# Patient Record
Sex: MALE | Race: OTHER | HISPANIC OR LATINO | URBAN - METROPOLITAN AREA
[De-identification: names, ages, dates, MRNs, and addresses within clinical notes are randomized per-mention and may not be internally consistent; named-entity substitution may affect disease eponyms.]

---

## 2019-08-26 ENCOUNTER — EMERGENCY (EMERGENCY)
Facility: HOSPITAL | Age: 54
LOS: 1 days | Discharge: HOME | End: 2019-08-26
Attending: EMERGENCY MEDICINE | Admitting: EMERGENCY MEDICINE
Payer: COMMERCIAL

## 2019-08-26 VITALS
RESPIRATION RATE: 18 BRPM | WEIGHT: 220.02 LBS | SYSTOLIC BLOOD PRESSURE: 143 MMHG | TEMPERATURE: 99 F | HEART RATE: 92 BPM | OXYGEN SATURATION: 96 % | DIASTOLIC BLOOD PRESSURE: 93 MMHG

## 2019-08-26 PROCEDURE — 99283 EMERGENCY DEPT VISIT LOW MDM: CPT

## 2019-08-26 PROCEDURE — 73610 X-RAY EXAM OF ANKLE: CPT

## 2019-08-26 PROCEDURE — 99284 EMERGENCY DEPT VISIT MOD MDM: CPT | Mod: 25

## 2019-08-26 PROCEDURE — 73610 X-RAY EXAM OF ANKLE: CPT | Mod: 26,RT

## 2019-08-26 PROCEDURE — 73590 X-RAY EXAM OF LOWER LEG: CPT

## 2019-08-26 PROCEDURE — 73590 X-RAY EXAM OF LOWER LEG: CPT | Mod: 26,RT

## 2019-08-26 RX ORDER — IBUPROFEN 200 MG
600 TABLET ORAL ONCE
Refills: 0 | Status: COMPLETED | OUTPATIENT
Start: 2019-08-26 | End: 2019-08-26

## 2019-08-26 RX ORDER — AMLODIPINE BESYLATE 2.5 MG/1
0 TABLET ORAL
Qty: 0 | Refills: 0 | DISCHARGE

## 2019-08-26 RX ORDER — HYDROCHLOROTHIAZIDE 25 MG
0 TABLET ORAL
Qty: 0 | Refills: 0 | DISCHARGE

## 2019-08-26 RX ORDER — LISINOPRIL 2.5 MG/1
0 TABLET ORAL
Qty: 0 | Refills: 0 | DISCHARGE

## 2019-08-26 RX ORDER — ATORVASTATIN CALCIUM 80 MG/1
0 TABLET, FILM COATED ORAL
Qty: 0 | Refills: 0 | DISCHARGE

## 2019-08-26 RX ADMIN — Medication 600 MILLIGRAM(S): at 15:40

## 2019-08-26 NOTE — ED PROVIDER NOTE - NSFOLLOWUPINSTRUCTIONS_ED_ALL_ED_FT
1) Follow-up with Dr. Rodriguez. Call today / next business day for prompt follow-up.  2) Return to Emergency room for any worsening or persistent pain, weakness, fever, or any other concerning symptoms.  3) See attached instruction sheets for additional information, including information regarding signs and symptoms to look out for, reasons to seek immediate care and other important instructions.  4) Rest, ice, compression, elevation  5) Motrin 600mg every 6 hours as needed for pain.  Take with food

## 2019-08-26 NOTE — ED ADULT NURSE NOTE - NSIMPLEMENTINTERV_GEN_ALL_ED
Implemented All Fall Risk Interventions:  [place] to call system. Call bell, personal items and telephone within reach. Instruct patient to call for assistance. Room bathroom lighting operational. Non-slip footwear when patient is off stretcher. Physically safe environment: no spills, clutter or unnecessary equipment. Stretcher in lowest position, wheels locked, appropriate side rails in place. Provide visual cue, wrist band, yellow gown, etc. Monitor gait and stability. Monitor for mental status changes and reorient to person, place, and time. Review medications for side effects contributing to fall risk. Reinforce activity limits and safety measures with patient and family.

## 2019-08-26 NOTE — ED PROVIDER NOTE - PHYSICAL EXAMINATION
Gen: Alert, NAD  Head/eyes: NC/AT, PERRL, EOMI, normal lids/conjunctiva, no scleral icterus  ENT: airway patent  Neck: supple, no tenderness/meningismus/JVD, Trachea midline  Pulm/lung: Bilateral clear BS, normal resp effort, no wheeze/stridor/retractions  CV/heart: RRR, no M/R/G, +2 dist pulses (radial, pedal DP/PT, popliteal)  GI/Abd: soft, NT/ND, +BS, no guarding/rebound tenderness  Musculoskeletal: no edema/erythema/cyanosis, FROM in all extremities, no C/T/L spine ttp, +ttp right lateral malleolus, mild ttp right fibular head, no base of 5th MTP  Skin: no rash, no vesicles, no petechaie, no ecchymosis, no swelling  Neuro: AAOx3, CN 2-12 intact, normal sensation, 5/5 motor strength in all extremities, normal gait, no dysmetria

## 2019-08-26 NOTE — ED ADULT NURSE NOTE - OBJECTIVE STATEMENT
patient came in ED from work with c/o right ankle pain. patient states he came off his truck and as he landed on a wooden floor his right ankle twisted and he landed on his buttocks. patient denies head injury.

## 2019-08-26 NOTE — ED PROVIDER NOTE - OBJECTIVE STATEMENT
54 yo male s/p mechanical fall out of truck stepped onto wooden pallet twisted right ankle c/o right lateral malleolar pain, nonradiating, mild to moderate, worse with movement. No medications taken for this. No head injury, no other complaints.

## 2019-08-26 NOTE — ED PROVIDER NOTE - NS ED ROS FT

## 2023-09-11 PROBLEM — I10 ESSENTIAL (PRIMARY) HYPERTENSION: Chronic | Status: ACTIVE | Noted: 2019-08-26

## 2023-09-19 ENCOUNTER — RESULT CHARGE (OUTPATIENT)
Age: 58
End: 2023-09-19

## 2023-09-20 ENCOUNTER — APPOINTMENT (OUTPATIENT)
Dept: FAMILY MEDICINE | Facility: HOSPITAL | Age: 58
End: 2023-09-20
Payer: SELF-PAY

## 2023-09-20 ENCOUNTER — OUTPATIENT (OUTPATIENT)
Dept: OUTPATIENT SERVICES | Facility: HOSPITAL | Age: 58
LOS: 1 days | End: 2023-09-20
Payer: SELF-PAY

## 2023-09-20 VITALS
OXYGEN SATURATION: 97 % | HEART RATE: 74 BPM | TEMPERATURE: 98 F | DIASTOLIC BLOOD PRESSURE: 86 MMHG | BODY MASS INDEX: 34.11 KG/M2 | SYSTOLIC BLOOD PRESSURE: 154 MMHG | WEIGHT: 205 LBS | RESPIRATION RATE: 14 BRPM

## 2023-09-20 DIAGNOSIS — Z00.00 ENCOUNTER FOR GENERAL ADULT MEDICAL EXAMINATION W/OUT ABNORMAL FINDINGS: ICD-10-CM

## 2023-09-20 DIAGNOSIS — E66.9 OBESITY, UNSPECIFIED: ICD-10-CM

## 2023-09-20 DIAGNOSIS — Z91.19 PATIENT'S NONCOMPLIANCE WITH OTHER MEDICAL TREATMENT AND REGIMEN: ICD-10-CM

## 2023-09-20 DIAGNOSIS — I10 ESSENTIAL (PRIMARY) HYPERTENSION: ICD-10-CM

## 2023-09-20 DIAGNOSIS — M81.0 AGE-RELATED OSTEOPOROSIS WITHOUT CURRENT PATHOLOGICAL FRACTURE: ICD-10-CM

## 2023-09-20 DIAGNOSIS — E78.5 HYPERLIPIDEMIA, UNSPECIFIED: ICD-10-CM

## 2023-09-20 DIAGNOSIS — Z00.00 ENCOUNTER FOR GENERAL ADULT MEDICAL EXAMINATION WITHOUT ABNORMAL FINDINGS: ICD-10-CM

## 2023-09-20 DIAGNOSIS — R40.20 UNSPECIFIED COMA: ICD-10-CM

## 2023-09-20 PROCEDURE — 84100 ASSAY OF PHOSPHORUS: CPT

## 2023-09-20 PROCEDURE — 83735 ASSAY OF MAGNESIUM: CPT

## 2023-09-20 PROCEDURE — 80053 COMPREHEN METABOLIC PANEL: CPT

## 2023-09-20 PROCEDURE — 83036 HEMOGLOBIN GLYCOSYLATED A1C: CPT

## 2023-09-20 PROCEDURE — 85025 COMPLETE CBC W/AUTO DIFF WBC: CPT

## 2023-09-20 PROCEDURE — 93010 ELECTROCARDIOGRAM REPORT: CPT

## 2023-09-20 PROCEDURE — 93005 ELECTROCARDIOGRAM TRACING: CPT

## 2023-09-20 PROCEDURE — 80061 LIPID PANEL: CPT

## 2023-09-20 PROCEDURE — G0463: CPT

## 2023-09-20 RX ORDER — HYDROCHLOROTHIAZIDE 12.5 MG/1
12.5 CAPSULE ORAL DAILY
Qty: 30 | Refills: 3 | Status: ACTIVE | COMMUNITY
Start: 2023-09-20 | End: 1900-01-01

## 2023-09-20 RX ORDER — MECLIZINE HYDROCHLORIDE 25 MG/1
25 TABLET ORAL 3 TIMES DAILY
Qty: 30 | Refills: 0 | Status: ACTIVE | COMMUNITY
Start: 2023-09-20 | End: 1900-01-01

## 2023-09-20 RX ORDER — LOSARTAN POTASSIUM 100 MG/1
100 TABLET, FILM COATED ORAL
Qty: 30 | Refills: 2 | Status: ACTIVE | COMMUNITY
Start: 2023-09-20 | End: 1900-01-01

## 2023-09-20 RX ORDER — AMLODIPINE BESYLATE 10 MG/1
10 TABLET ORAL
Qty: 30 | Refills: 3 | Status: ACTIVE | COMMUNITY
Start: 2023-09-20 | End: 1900-01-01

## 2023-09-22 LAB
ALBUMIN SERPL ELPH-MCNC: 4.8 G/DL
ALP BLD-CCNC: 87 U/L
ALT SERPL-CCNC: 20 U/L
ANION GAP SERPL CALC-SCNC: 13 MMOL/L
AST SERPL-CCNC: 21 U/L
BASOPHILS # BLD AUTO: 0.06 K/UL
BASOPHILS NFR BLD AUTO: 0.8 %
BILIRUB SERPL-MCNC: 0.4 MG/DL
BUN SERPL-MCNC: 13 MG/DL
CALCIUM SERPL-MCNC: 9.4 MG/DL
CHLORIDE SERPL-SCNC: 103 MMOL/L
CHOLEST SERPL-MCNC: 206 MG/DL
CO2 SERPL-SCNC: 29 MMOL/L
CREAT SERPL-MCNC: 0.91 MG/DL
EGFR: 100 ML/MIN/1.73M2
EOSINOPHIL # BLD AUTO: 0.64 K/UL
EOSINOPHIL NFR BLD AUTO: 8.2 %
ESTIMATED AVERAGE GLUCOSE: 123 MG/DL
GLUCOSE SERPL-MCNC: 95 MG/DL
HBA1C MFR BLD HPLC: 5.9 %
HCT VFR BLD CALC: 44.1 %
HDLC SERPL-MCNC: 59 MG/DL
HGB BLD-MCNC: 14.5 G/DL
IMM GRANULOCYTES NFR BLD AUTO: 0.1 %
LDLC SERPL CALC-MCNC: 124 MG/DL
LYMPHOCYTES # BLD AUTO: 2.08 K/UL
LYMPHOCYTES NFR BLD AUTO: 26.7 %
MAGNESIUM SERPL-MCNC: 1.9 MG/DL
MAN DIFF?: NORMAL
MCHC RBC-ENTMCNC: 30.5 PG
MCHC RBC-ENTMCNC: 32.9 GM/DL
MCV RBC AUTO: 92.6 FL
MONOCYTES # BLD AUTO: 0.5 K/UL
MONOCYTES NFR BLD AUTO: 6.4 %
NEUTROPHILS # BLD AUTO: 4.5 K/UL
NEUTROPHILS NFR BLD AUTO: 57.8 %
NONHDLC SERPL-MCNC: 146 MG/DL
PHOSPHATE SERPL-MCNC: 3 MG/DL
PLATELET # BLD AUTO: 274 K/UL
POTASSIUM SERPL-SCNC: 3.2 MMOL/L
PROT SERPL-MCNC: 7.7 G/DL
RBC # BLD: 4.76 M/UL
RBC # FLD: 14.6 %
SODIUM SERPL-SCNC: 145 MMOL/L
TRIGL SERPL-MCNC: 123 MG/DL
WBC # FLD AUTO: 7.79 K/UL

## 2023-09-27 ENCOUNTER — APPOINTMENT (OUTPATIENT)
Dept: FAMILY MEDICINE | Facility: HOSPITAL | Age: 58
End: 2023-09-27

## 2023-09-27 ENCOUNTER — OUTPATIENT (OUTPATIENT)
Dept: OUTPATIENT SERVICES | Facility: HOSPITAL | Age: 58
LOS: 1 days | End: 2023-09-27
Payer: SELF-PAY

## 2023-09-27 VITALS
TEMPERATURE: 98 F | BODY MASS INDEX: 34.61 KG/M2 | HEART RATE: 96 BPM | DIASTOLIC BLOOD PRESSURE: 90 MMHG | RESPIRATION RATE: 14 BRPM | SYSTOLIC BLOOD PRESSURE: 146 MMHG | OXYGEN SATURATION: 98 % | WEIGHT: 208 LBS

## 2023-09-27 DIAGNOSIS — Z00.00 ENCOUNTER FOR GENERAL ADULT MEDICAL EXAMINATION WITHOUT ABNORMAL FINDINGS: ICD-10-CM

## 2023-09-27 DIAGNOSIS — I10 ESSENTIAL (PRIMARY) HYPERTENSION: ICD-10-CM

## 2023-09-27 DIAGNOSIS — E87.6 HYPOKALEMIA: ICD-10-CM

## 2023-09-27 DIAGNOSIS — R40.20 UNSPECIFIED COMA: ICD-10-CM

## 2023-09-27 DIAGNOSIS — H81.10 BENIGN PAROXYSMAL VERTIGO, UNSPECIFIED EAR: ICD-10-CM

## 2023-09-27 PROCEDURE — G0463: CPT

## 2023-09-27 RX ORDER — POTASSIUM CHLORIDE 750 MG/1
10 CAPSULE, EXTENDED RELEASE ORAL DAILY
Qty: 30 | Refills: 2 | Status: ACTIVE | COMMUNITY
Start: 2023-09-27 | End: 1900-01-01

## 2023-10-03 ENCOUNTER — OUTPATIENT (OUTPATIENT)
Dept: OUTPATIENT SERVICES | Facility: HOSPITAL | Age: 58
LOS: 1 days | End: 2023-10-03
Payer: SELF-PAY

## 2023-10-03 DIAGNOSIS — R40.20 UNSPECIFIED COMA: ICD-10-CM

## 2023-10-03 DIAGNOSIS — I10 ESSENTIAL (PRIMARY) HYPERTENSION: ICD-10-CM

## 2023-10-03 DIAGNOSIS — E78.5 HYPERLIPIDEMIA, UNSPECIFIED: ICD-10-CM

## 2023-10-03 PROCEDURE — 93306 TTE W/DOPPLER COMPLETE: CPT

## 2023-10-03 PROCEDURE — 93306 TTE W/DOPPLER COMPLETE: CPT | Mod: 26

## 2023-10-19 ENCOUNTER — APPOINTMENT (OUTPATIENT)
Dept: FAMILY MEDICINE | Facility: HOSPITAL | Age: 58
End: 2023-10-19

## 2024-10-04 ENCOUNTER — NON-APPOINTMENT (OUTPATIENT)
Age: 59
End: 2024-10-04

## 2024-10-04 ENCOUNTER — APPOINTMENT (OUTPATIENT)
Dept: CARDIOLOGY | Facility: CLINIC | Age: 59
End: 2024-10-04
Payer: COMMERCIAL

## 2024-10-04 VITALS
HEART RATE: 74 BPM | OXYGEN SATURATION: 93 % | SYSTOLIC BLOOD PRESSURE: 161 MMHG | BODY MASS INDEX: 35.99 KG/M2 | HEIGHT: 65 IN | DIASTOLIC BLOOD PRESSURE: 96 MMHG | WEIGHT: 216 LBS

## 2024-10-04 DIAGNOSIS — I10 ESSENTIAL (PRIMARY) HYPERTENSION: ICD-10-CM

## 2024-10-04 DIAGNOSIS — E78.5 HYPERLIPIDEMIA, UNSPECIFIED: ICD-10-CM

## 2024-10-04 PROCEDURE — 99205 OFFICE O/P NEW HI 60 MIN: CPT

## 2024-10-04 PROCEDURE — G2211 COMPLEX E/M VISIT ADD ON: CPT | Mod: NC

## 2024-10-04 PROCEDURE — 93000 ELECTROCARDIOGRAM COMPLETE: CPT

## 2024-10-04 RX ORDER — EPLERENONE 25 MG/1
25 TABLET, COATED ORAL DAILY
Qty: 30 | Refills: 2 | Status: ACTIVE | COMMUNITY
Start: 2024-10-04 | End: 1900-01-01

## 2024-10-04 NOTE — DISCUSSION/SUMMARY
[FreeTextEntry1] : This is a 58 year old man with a history of obesity and hypertension who is referred to the office for a cardiac evaluation.  In September of 2024 he was sent to the ED by his PMD as he was found to be severely hypokalemic.  This was caused by increasing HCTZ to 25 QD.  His dose was reduced to 12.5 again.   I prefer to stop HCTZ and start eplerenone 25 QD.  He will continue losartan 100 QD and amlodipine 10 QD.  He will see me back in one month, and have blood work prior to the visit.  I will order an echocardiogram at his next visit.  We discussed the benefits of a healthy diet, regular exercise and physical activity, and weight loss in detail.  [EKG obtained to assist in diagnosis and management of assessed problem(s)] : EKG obtained to assist in diagnosis and management of assessed problem(s)

## 2024-10-04 NOTE — HISTORY OF PRESENT ILLNESS
[FreeTextEntry1] : This is a 58 year old man with a history of obesity and hypertension who is referred to the office for a cardiac evaluation.  In September of 2024 he was sent to the ED by his PMD as he was found to be severely hypokalemic.  This was caused by increasing HCTZ to 25 QD.  His dose was reduced to 12.5 again.  He is here for follow up.  He has no symptoms, denying chest pains, increased dyspnea, PND, orthopnea, LE swelling, dizziness, or syncope.  He has never seen a cardiologist.  He is active at work, and never has any symptoms with exertion.